# Patient Record
Sex: FEMALE
[De-identification: names, ages, dates, MRNs, and addresses within clinical notes are randomized per-mention and may not be internally consistent; named-entity substitution may affect disease eponyms.]

---

## 2018-11-23 ENCOUNTER — IMAGING SERVICES (OUTPATIENT)
Dept: OTHER | Age: 78
End: 2018-11-23

## 2024-04-01 ENCOUNTER — OFFICE VISIT (OUTPATIENT)
Dept: INTERNAL MEDICINE CLINIC | Facility: CLINIC | Age: 84
End: 2024-04-01

## 2024-04-01 VITALS
DIASTOLIC BLOOD PRESSURE: 80 MMHG | OXYGEN SATURATION: 98 % | TEMPERATURE: 98 F | HEART RATE: 83 BPM | SYSTOLIC BLOOD PRESSURE: 140 MMHG | BODY MASS INDEX: 35.34 KG/M2 | WEIGHT: 180 LBS | HEIGHT: 60 IN

## 2024-04-01 DIAGNOSIS — Z76.89 ENCOUNTER TO ESTABLISH CARE: Primary | ICD-10-CM

## 2024-04-01 DIAGNOSIS — G62.89 OTHER POLYNEUROPATHY: ICD-10-CM

## 2024-04-01 DIAGNOSIS — R73.9 HYPERGLYCEMIA: ICD-10-CM

## 2024-04-01 DIAGNOSIS — R92.0 BREAST MICROCALCIFICATIONS: ICD-10-CM

## 2024-04-01 DIAGNOSIS — Z78.0 MENOPAUSE: ICD-10-CM

## 2024-04-01 DIAGNOSIS — I10 ESSENTIAL HYPERTENSION, BENIGN: ICD-10-CM

## 2024-04-01 DIAGNOSIS — E78.00 PURE HYPERCHOLESTEROLEMIA: ICD-10-CM

## 2024-04-01 PROBLEM — G62.9 PERIPHERAL NEUROPATHY: Status: ACTIVE | Noted: 2024-04-01

## 2024-04-01 RX ORDER — MELOXICAM 15 MG/1
15 TABLET ORAL DAILY
COMMUNITY
Start: 2023-01-25 | End: 2024-04-01

## 2024-04-01 RX ORDER — AMITRIPTYLINE HYDROCHLORIDE 50 MG/1
50 TABLET, FILM COATED ORAL NIGHTLY
Qty: 90 TABLET | Refills: 3 | Status: SHIPPED | OUTPATIENT
Start: 2024-04-01

## 2024-04-01 RX ORDER — FOLIC ACID 1 MG/1
2 TABLET ORAL DAILY
COMMUNITY
Start: 2022-10-24 | End: 2024-04-01

## 2024-04-01 RX ORDER — ALENDRONATE SODIUM 35 MG/1
35 TABLET ORAL WEEKLY
COMMUNITY
Start: 2022-10-24 | End: 2024-04-01

## 2024-04-01 RX ORDER — TRIAMTERENE AND HYDROCHLOROTHIAZIDE 37.5; 25 MG/1; MG/1
1 CAPSULE ORAL DAILY
Qty: 90 CAPSULE | Refills: 3 | Status: SHIPPED | OUTPATIENT
Start: 2024-04-01

## 2024-04-01 RX ORDER — METOPROLOL SUCCINATE 100 MG/1
100 TABLET, EXTENDED RELEASE ORAL DAILY
Qty: 90 TABLET | Refills: 3 | Status: SHIPPED | OUTPATIENT
Start: 2024-04-01

## 2024-04-01 RX ORDER — METOPROLOL SUCCINATE 100 MG/1
100 TABLET, EXTENDED RELEASE ORAL DAILY
COMMUNITY
Start: 2023-11-28 | End: 2024-04-01

## 2024-04-01 RX ORDER — TRIAMTERENE AND HYDROCHLOROTHIAZIDE 37.5; 25 MG/1; MG/1
1 CAPSULE ORAL DAILY
COMMUNITY
Start: 2023-11-28 | End: 2024-04-01

## 2024-04-01 RX ORDER — GLUCOSAM/CHON-MSM1/C/MANG/BOSW 750-644 MG
2 TABLET ORAL DAILY
COMMUNITY
Start: 2023-01-25 | End: 2024-04-01

## 2024-04-02 NOTE — PROGRESS NOTES
HPI:    Patient ID: Rabia Nettles is a 83 year old female.    HPIpatient is coming back to see me after 10 years being in [place] .   No chest pain , no shortness of breath .   She has HTN, neuropathy , borderline lipids and hyperglycemia.   She might have beenon thyroid medication before, but not sure.  She had vein surgery on her legs, she has swelling when she goes to [place] , doesn't like to wear compression stockings.       Review of Systems   Constitutional:  Negative for activity change, appetite change, chills, fatigue and fever.   HENT:  Negative for ear pain, hearing loss, nosebleeds, postnasal drip, rhinorrhea, sneezing and tinnitus.    Eyes:  Negative for pain, discharge, redness, itching and visual disturbance.   Respiratory:  Negative for apnea, cough, chest tightness, shortness of breath and wheezing.    Cardiovascular:  Negative for chest pain, palpitations and leg swelling.   Gastrointestinal:  Negative for abdominal distention, blood in stool, constipation, diarrhea and nausea.   Endocrine: Negative for cold intolerance and heat intolerance.   Genitourinary:  Negative for difficulty urinating, dysuria, flank pain, frequency, genital sores, hematuria and urgency.   Musculoskeletal:  Positive for arthralgias and joint swelling. Negative for neck pain and neck stiffness.   Allergic/Immunologic: Negative for immunocompromised state.   Neurological:  Positive for numbness. Negative for dizziness, syncope, facial asymmetry, weakness, light-headedness and headaches.   Psychiatric/Behavioral: Negative.              Current Outpatient Medications   Medication Sig Dispense Refill    cholecalciferol 125 MCG (5000 UT) Oral Tab Take 1 tablet (5,000 Units total) by mouth daily.      amitriptyline 50 MG Oral Tab Take 1 tablet (50 mg total) by mouth nightly. 90 tablet 3    triamterene-hydroCHLOROthiazide 37.5-25 MG Oral Cap Take 1 capsule by mouth daily. 90 capsule 3    metoprolol succinate  MG Oral Tablet 24  Hr Take 1 tablet (100 mg total) by mouth daily. 90 tablet 3     Allergies:No Known Allergies   PHYSICAL EXAM:   /80   Pulse 83   Temp 97.9 °F (36.6 °C)   Ht 5' (1.524 m)   Wt 180 lb (81.6 kg)   SpO2 98%   BMI 35.15 kg/m²      Physical Exam  Constitutional:       Appearance: She is well-developed.   HENT:      Head: Normocephalic and atraumatic.      Nose: No congestion or rhinorrhea.      Mouth/Throat:      Mouth: Mucous membranes are moist.      Pharynx: Oropharynx is clear.   Eyes:      Pupils: Pupils are equal, round, and reactive to light.   Neck:      Thyroid: No thyromegaly.   Cardiovascular:      Rate and Rhythm: Normal rate and regular rhythm.      Heart sounds: Normal heart sounds. No murmur heard.     No gallop.   Pulmonary:      Effort: Pulmonary effort is normal. No respiratory distress.      Breath sounds: Normal breath sounds. No wheezing or rales.   Chest:      Chest wall: No tenderness.   Abdominal:      General: There is no distension.      Palpations: There is no mass.      Tenderness: There is no abdominal tenderness. There is no guarding or rebound.   Musculoskeletal:         General: No swelling, tenderness, deformity or signs of injury.      Cervical back: Normal range of motion.      Right lower leg: No edema.      Left lower leg: No edema.   Skin:     General: Skin is warm.      Coloration: Skin is not pale.      Findings: No erythema or rash.   Neurological:      Mental Status: She is alert and oriented to person, place, and time.      Motor: No abnormal muscle tone.      Coordination: Coordination normal.   Psychiatric:         Behavior: Behavior normal.         Thought Content: Thought content normal.         Judgment: Judgment normal.                ASSESSMENT/PLAN:   Pure hypercholesterolemia  Lipids soon .     Peripheral neuropathy  Continue Elavil, see podiatry .     Menopause  One density , has osteopenia.     Hyperglycemia  Labs soon .     Essential hypertension,  benign  Bp is controlled.     Breast microcalcifications  Mammo , diagnostic.     Orders Placed This Encounter   Procedures    CBC With Differential With Platelet    Comp Metabolic Panel (14)    Hemoglobin A1C    Lipid Panel    Assay, Thyroid Stim Hormone    Free T4, (Free Thyroxine)       Meds This Visit:  Requested Prescriptions     Signed Prescriptions Disp Refills    amitriptyline 50 MG Oral Tab 90 tablet 3     Sig: Take 1 tablet (50 mg total) by mouth nightly.    triamterene-hydroCHLOROthiazide 37.5-25 MG Oral Cap 90 capsule 3     Sig: Take 1 capsule by mouth daily.    metoprolol succinate  MG Oral Tablet 24 Hr 90 tablet 3     Sig: Take 1 tablet (100 mg total) by mouth daily.       Imaging & Referrals:  PODIATRY - INTERNAL  YANDY DIAGNOSTIC BILATERAL (CPT=77066)  XR DEXA BONE DENSITOMETRY (CPT=77080)       ID#[id number]

## 2024-04-06 ENCOUNTER — LAB ENCOUNTER (OUTPATIENT)
Dept: LAB | Age: 84
End: 2024-04-06
Attending: INTERNAL MEDICINE
Payer: MEDICARE

## 2024-04-06 DIAGNOSIS — E78.00 PURE HYPERCHOLESTEROLEMIA: ICD-10-CM

## 2024-04-06 DIAGNOSIS — I10 ESSENTIAL HYPERTENSION, BENIGN: ICD-10-CM

## 2024-04-06 LAB
ALBUMIN SERPL-MCNC: 4.4 G/DL (ref 3.2–4.8)
ALBUMIN/GLOB SERPL: 1.3 {RATIO} (ref 1–2)
ALP LIVER SERPL-CCNC: 112 U/L
ALT SERPL-CCNC: 18 U/L
ANION GAP SERPL CALC-SCNC: 6 MMOL/L (ref 0–18)
AST SERPL-CCNC: 23 U/L (ref ?–34)
BASOPHILS # BLD AUTO: 0.06 X10(3) UL (ref 0–0.2)
BASOPHILS NFR BLD AUTO: 1 %
BILIRUB SERPL-MCNC: 0.5 MG/DL (ref 0.2–1.1)
BUN BLD-MCNC: 21 MG/DL (ref 9–23)
BUN/CREAT SERPL: 21.4 (ref 10–20)
CALCIUM BLD-MCNC: 9.7 MG/DL (ref 8.7–10.4)
CHLORIDE SERPL-SCNC: 102 MMOL/L (ref 98–112)
CHOLEST SERPL-MCNC: 206 MG/DL (ref ?–200)
CO2 SERPL-SCNC: 28 MMOL/L (ref 21–32)
CREAT BLD-MCNC: 0.98 MG/DL
DEPRECATED RDW RBC AUTO: 42.5 FL (ref 35.1–46.3)
EGFRCR SERPLBLD CKD-EPI 2021: 58 ML/MIN/1.73M2 (ref 60–?)
EOSINOPHIL # BLD AUTO: 0.32 X10(3) UL (ref 0–0.7)
EOSINOPHIL NFR BLD AUTO: 5.2 %
ERYTHROCYTE [DISTWIDTH] IN BLOOD BY AUTOMATED COUNT: 13.4 % (ref 11–15)
EST. AVERAGE GLUCOSE BLD GHB EST-MCNC: 151 MG/DL (ref 68–126)
FASTING PATIENT LIPID ANSWER: YES
FASTING STATUS PATIENT QL REPORTED: YES
GLOBULIN PLAS-MCNC: 3.3 G/DL (ref 2.8–4.4)
GLUCOSE BLD-MCNC: 127 MG/DL (ref 70–99)
HBA1C MFR BLD: 6.9 % (ref ?–5.7)
HCT VFR BLD AUTO: 38.8 %
HDLC SERPL-MCNC: 51 MG/DL (ref 40–59)
HGB BLD-MCNC: 13.5 G/DL
IMM GRANULOCYTES # BLD AUTO: 0.02 X10(3) UL (ref 0–1)
IMM GRANULOCYTES NFR BLD: 0.3 %
LDLC SERPL CALC-MCNC: 137 MG/DL (ref ?–100)
LYMPHOCYTES # BLD AUTO: 1.93 X10(3) UL (ref 1–4)
LYMPHOCYTES NFR BLD AUTO: 31.6 %
MCH RBC QN AUTO: 30.2 PG (ref 26–34)
MCHC RBC AUTO-ENTMCNC: 34.8 G/DL (ref 31–37)
MCV RBC AUTO: 86.8 FL
MONOCYTES # BLD AUTO: 0.48 X10(3) UL (ref 0.1–1)
MONOCYTES NFR BLD AUTO: 7.9 %
NEUTROPHILS # BLD AUTO: 3.3 X10 (3) UL (ref 1.5–7.7)
NEUTROPHILS # BLD AUTO: 3.3 X10(3) UL (ref 1.5–7.7)
NEUTROPHILS NFR BLD AUTO: 54 %
NONHDLC SERPL-MCNC: 155 MG/DL (ref ?–130)
OSMOLALITY SERPL CALC.SUM OF ELEC: 287 MOSM/KG (ref 275–295)
PLATELET # BLD AUTO: 319 10(3)UL (ref 150–450)
POTASSIUM SERPL-SCNC: 3.6 MMOL/L (ref 3.5–5.1)
PROT SERPL-MCNC: 7.7 G/DL (ref 5.7–8.2)
RBC # BLD AUTO: 4.47 X10(6)UL
SODIUM SERPL-SCNC: 136 MMOL/L (ref 136–145)
T4 FREE SERPL-MCNC: 1.1 NG/DL (ref 0.8–1.7)
TRIGL SERPL-MCNC: 98 MG/DL (ref 30–149)
TSI SER-ACNC: 3.34 MIU/ML (ref 0.55–4.78)
VLDLC SERPL CALC-MCNC: 18 MG/DL (ref 0–30)
WBC # BLD AUTO: 6.1 X10(3) UL (ref 4–11)

## 2024-04-06 PROCEDURE — 84439 ASSAY OF FREE THYROXINE: CPT

## 2024-04-06 PROCEDURE — 84443 ASSAY THYROID STIM HORMONE: CPT

## 2024-04-06 PROCEDURE — 85025 COMPLETE CBC W/AUTO DIFF WBC: CPT

## 2024-04-06 PROCEDURE — 36415 COLL VENOUS BLD VENIPUNCTURE: CPT

## 2024-04-06 PROCEDURE — 80053 COMPREHEN METABOLIC PANEL: CPT

## 2024-04-06 PROCEDURE — 83036 HEMOGLOBIN GLYCOSYLATED A1C: CPT | Performed by: INTERNAL MEDICINE

## 2024-04-06 PROCEDURE — 80061 LIPID PANEL: CPT

## 2024-04-09 ENCOUNTER — TELEPHONE (OUTPATIENT)
Dept: INTERNAL MEDICINE CLINIC | Facility: CLINIC | Age: 84
End: 2024-04-09

## 2024-04-09 NOTE — TELEPHONE ENCOUNTER
Son in law calling for patient's test results.  Son in law is not on KAIN. Per son will have patient's daughter call instead.

## 2024-04-10 NOTE — TELEPHONE ENCOUNTER
Spoke with patient (identified name and ), results reviewed and agrees with plan.        Rocco Au MD  2024  7:57 AM CDT       Glucose is 127 , liver and kidney functions are normal, chol is 206 , goal is less than 200 , hdl is 51, goal is greater than 50 , ldl is 137, goal is less than 100 . Watch carbohydrates, sweets, and a low fat diet . Thyroid functions are normal, wbc is normal, no anemia, platelets are normal.

## 2024-08-27 ENCOUNTER — HOSPITAL ENCOUNTER (OUTPATIENT)
Dept: BONE DENSITY | Age: 84
Discharge: HOME | End: 2024-08-27
Attending: INTERNAL MEDICINE
Payer: MEDICARE

## 2024-08-27 DIAGNOSIS — Z78.0 MENOPAUSE: ICD-10-CM

## 2024-08-27 PROCEDURE — 77080 DXA BONE DENSITY AXIAL: CPT | Performed by: INTERNAL MEDICINE

## 2024-09-05 ENCOUNTER — TELEPHONE (OUTPATIENT)
Dept: INTERNAL MEDICINE CLINIC | Facility: CLINIC | Age: 84
End: 2024-09-05

## 2024-09-05 NOTE — TELEPHONE ENCOUNTER
Jonathan (son -in -law ===NOT KAIN ), called back.     Informed that RN cannot give any information as he is not listed as one of the KAIN.     Stated that they had signed the KAIN FORM  last visit  with the patient and her daughter, and they updated it to include his name.      RN reviewed the chart. Last visit 4/1/24 verbal release (under MEDIA and story board), only 2 daughters are listed for KAIN .         See also 4/9/24 test results encounter .       IMAGING XR DEXA SCAN 8/27/24;   XR DEXA BONE DENSITOMETRY (CPT=77080): Result Notes     Carmen Dalyes  9/5/2024  2:36 PM CDT       Message left for patient to call back    Rocco Au MD  9/5/2024  1:05 PM CDT       The bone density shows that there is osteopenia of the left femoral neck, the total hip bone density is normal and the spine bone density is normal.   The recommendation is to take oscal twice a day and to walk daily to keep the bone strong.

## 2024-09-06 NOTE — TELEPHONE ENCOUNTER
Using language line : Mary Ann ID # [id number]    Noted patient's son Jonathan Stover-listed as main contact but not on updated KAIN.  Left message to call back.    Also left a message for the patient's daughter, Mandy Stover to call back for results.     KAIN dated-04/01/24-lists the 2 daughters on file.     See Also result note-Dr Alyssa BRYANT also attempted to call the patient.   Informed Dr Alyssa Haynes to review the KAIN on file.

## 2024-09-19 ENCOUNTER — TELEPHONE (OUTPATIENT)
Dept: INTERNAL MEDICINE CLINIC | Facility: CLINIC | Age: 84
End: 2024-09-19

## 2024-09-19 NOTE — TELEPHONE ENCOUNTER
Patient's daughter Mandy called for DEXA results, on KAIN, verified patient's name/.  Asking about recommendations.    Advised patient should try Oscal supplement, typically this is 500 mg, take twice daily and walk daily to help keep bones strong.  Mandy stated understanding.           Carmen Haynes  2024  2:36 PM CDT       Message left for patient to call back    Rocco Au MD  2024  1:05 PM CDT       The bone density shows that there is osteopenia of the left femoral neck, the total hip bone density is normal and the spine bone density is normal.   The recommendation is to take oscal twice a day and to walk daily to keep the bone strong.

## 2024-10-03 ENCOUNTER — TELEPHONE (OUTPATIENT)
Dept: CASE MANAGEMENT | Age: 84
End: 2024-10-03

## 2024-10-03 DIAGNOSIS — Z01.00 ENCOUNTER FOR COMPLETE EYE EXAM: Primary | ICD-10-CM

## 2024-10-03 NOTE — TELEPHONE ENCOUNTER
Dr. Au,     Daughter requesting referral to Dr. Vigil.     Pended referral please review diagnosis and sign off if you agree.    Thank you.  Jyoti Berry  [facility]

## 2024-11-01 ENCOUNTER — TELEPHONE (OUTPATIENT)
Dept: CASE MANAGEMENT | Age: 84
End: 2024-11-01

## 2024-11-01 DIAGNOSIS — Z01.00 ENCOUNTER FOR COMPLETE EYE EXAM: Primary | ICD-10-CM

## 2024-11-01 NOTE — TELEPHONE ENCOUNTER
Dr. Au,    Dr. Castrejon office requesting order to Dr. Castrejon for annual eye exam.      Pended referral please review diagnosis and sign off if you agree.    Thank you.  Jyoti Berry  [facility]

## 2025-02-17 ENCOUNTER — TELEPHONE (OUTPATIENT)
Dept: INTERNAL MEDICINE CLINIC | Facility: CLINIC | Age: 85
End: 2025-02-17

## 2025-02-17 NOTE — TELEPHONE ENCOUNTER
amitriptyline 50 MG Oral Tab, Take 1 tablet (50 mg total) by mouth nightly., Disp: 90 tablet, Rfl: 3    triamterene-hydroCHLOROthiazide 37.5-25 MG Oral Cap, Take 1 capsule by mouth daily., Disp: 90 capsule, Rfl: 3    metoprolol succinate  MG Oral Tablet 24 Hr, Take 1 tablet (100 mg total) by mouth daily., Disp: 90 tablet, Rfl: 3

## 2025-02-17 NOTE — TELEPHONE ENCOUNTER
Message left for patient to call back regarding refill request.  We received refill request from [facility] and [facility] for the same medications.

## 2025-02-21 RX ORDER — METOPROLOL SUCCINATE 100 MG/1
100 TABLET, EXTENDED RELEASE ORAL DAILY
Qty: 30 TABLET | Refills: 5 | Status: SHIPPED | OUTPATIENT
Start: 2025-02-21

## 2025-02-21 RX ORDER — TRIAMTERENE AND HYDROCHLOROTHIAZIDE 37.5; 25 MG/1; MG/1
1 CAPSULE ORAL DAILY
Qty: 30 CAPSULE | Refills: 5 | Status: SHIPPED | OUTPATIENT
Start: 2025-02-21

## 2025-02-21 RX ORDER — AMITRIPTYLINE HYDROCHLORIDE 50 MG/1
50 TABLET ORAL NIGHTLY
Qty: 30 TABLET | Refills: 5 | Status: SHIPPED | OUTPATIENT
Start: 2025-02-21

## 2025-02-21 NOTE — TELEPHONE ENCOUNTER
Please review; protocol failed/ has no protocol    Message sent for patient to make an appointment.

## 2025-02-24 NOTE — TELEPHONE ENCOUNTER
Call received from [facility] pharmacy, spoke to Neeam, pharmacy technician.   Asking about requested refills, they sent a fax to Dr. Au' office at #[phone].     Patient is new to [facility] - RN added to pharmacy list on chart.     With [facility]  [facility]aya ID # [id number].  Left message to call office back with hours.     Prescriptions were sent 2/21/25 to [email or web address], permission is needed to send them to [facility].     Attempt #1